# Patient Record
Sex: MALE
[De-identification: names, ages, dates, MRNs, and addresses within clinical notes are randomized per-mention and may not be internally consistent; named-entity substitution may affect disease eponyms.]

---

## 2022-11-19 ENCOUNTER — NURSE TRIAGE (OUTPATIENT)
Dept: OTHER | Facility: CLINIC | Age: 72
End: 2022-11-19

## 2022-11-19 NOTE — TELEPHONE ENCOUNTER
Location of patient: [address] call from [address] [facility] ([facility]) with [facility]. Viviana Little MRN: [id number]    Subjective: Caller states \" I had surgery on my right eye on Wednesday . I have been having shortness of breath with coughing\"    Current Symptoms:   +pt states they eye is doing ok - \"just wanted you to know about it\"   +coughing started 3-4 days ago   -denies chest pain or chest pain   +runny nose \"common for me\"   +fine when I am not coughing    -denies dizziness   +hx of DVT in my legs - 'I am going to PT now for achillis tendon tear\"   +\"they have ordered me compression socks\" - same - no worsening symptoms in regards to legs     Associated Symptoms: NA    Pain Severity: Denies      Temperature: Denies      What has been tried: cough medication     Recommended disposition: See PCP within 24 hours. Advised pt to follow up in Urgent Care. PCP office is closed this weekend. Pt wanted an antibiotic called in. Dr. Xavier Black called - would like for pt to be seen or evaluated before antibiotics are prescribed. Advised pt make an appointment for Monday or go to [facility] this weekend. Pt verbalized understanding of this plan. Care advice provided, patient verbalizes understanding; denies any other questions or concerns.     Outcome:  pt will follow up at [facility] this weekend or make appointment Monday per on- call provider recommendations       Reason for Disposition   SEVERE coughing spells (e.g., whooping sound after coughing, vomiting after coughing)    Protocols used: Cough - Acute Productive-ADULT-
No